# Patient Record
Sex: MALE | Race: WHITE | NOT HISPANIC OR LATINO | Employment: OTHER | URBAN - NONMETROPOLITAN AREA
[De-identification: names, ages, dates, MRNs, and addresses within clinical notes are randomized per-mention and may not be internally consistent; named-entity substitution may affect disease eponyms.]

---

## 2017-11-17 ENCOUNTER — HOSPITAL ENCOUNTER (EMERGENCY)
Facility: HOSPITAL | Age: 77
Discharge: HOME | End: 2017-11-17
Attending: EMERGENCY MEDICINE | Admitting: EMERGENCY MEDICINE

## 2017-11-17 VITALS
DIASTOLIC BLOOD PRESSURE: 70 MMHG | HEIGHT: 71 IN | TEMPERATURE: 98.1 F | RESPIRATION RATE: 16 BRPM | BODY MASS INDEX: 22.4 KG/M2 | OXYGEN SATURATION: 97 % | WEIGHT: 160 LBS | SYSTOLIC BLOOD PRESSURE: 136 MMHG | HEART RATE: 74 BPM

## 2017-11-17 DIAGNOSIS — J06.9 UPPER RESPIRATORY TRACT INFECTION, UNSPECIFIED TYPE: ICD-10-CM

## 2017-11-17 DIAGNOSIS — K40.90 NON-RECURRENT UNILATERAL INGUINAL HERNIA WITHOUT OBSTRUCTION OR GANGRENE: Primary | ICD-10-CM

## 2017-11-17 PROCEDURE — 99284 EMERGENCY DEPT VISIT MOD MDM: CPT

## 2017-11-17 RX ORDER — IBUPROFEN 600 MG/1
600 TABLET ORAL EVERY 6 HOURS PRN
Qty: 30 TABLET | Refills: 0 | Status: SHIPPED | OUTPATIENT
Start: 2017-11-17 | End: 2017-12-06

## 2017-11-17 RX ORDER — HYDROCODONE BITARTRATE AND ACETAMINOPHEN 10; 325 MG/1; MG/1
1 TABLET ORAL ONCE
Status: COMPLETED | OUTPATIENT
Start: 2017-11-17 | End: 2017-11-17

## 2017-11-17 RX ORDER — HYDROCODONE BITARTRATE AND ACETAMINOPHEN 10; 325 MG/1; MG/1
1 TABLET ORAL EVERY 8 HOURS PRN
COMMUNITY

## 2017-11-17 RX ORDER — ASPIRIN 325 MG
325 TABLET ORAL DAILY
COMMUNITY

## 2017-11-17 RX ORDER — ACETAMINOPHEN,DIPHENHYDRAMINE HCL 500; 25 MG/1; MG/1
1 TABLET, FILM COATED ORAL NIGHTLY PRN
COMMUNITY

## 2017-11-17 RX ORDER — HYDROCODONE BITARTRATE AND ACETAMINOPHEN 10; 325 MG/1; MG/1
TABLET ORAL
Status: DISCONTINUED
Start: 2017-11-17 | End: 2017-11-17 | Stop reason: HOSPADM

## 2017-11-17 RX ORDER — CHLORAL HYDRATE 500 MG
2000 CAPSULE ORAL
COMMUNITY

## 2017-11-17 RX ADMIN — HYDROCODONE BITARTRATE AND ACETAMINOPHEN 1 TABLET: 10; 325 TABLET ORAL at 14:34

## 2017-11-17 NOTE — ED NOTES
Patient resting quietly on stretcher with side rails up x2. Son present at bedside. No acute distress noted. No complaints voiced at this time. Hourly rounding performed. Will continue to monitor.      Inez Pickard RN  11/17/17 [id number]

## 2017-11-17 NOTE — ED NOTES
Patient resting quietly on stretcher with side rails up x2. Son present at bedside. Hourly rounding performed. Will continue to monitor.      Inez Pickard RN  11/17/17 [id number]

## 2017-11-17 NOTE — ED NOTES
"Patient presents to Emergency Department with complaints of \"Hernia in my right groin\". Patient reports the hernia happened approximately 2 weeks ago, was not really bothering him until the last couple of days when he started having a cough.  Patient reports the hernia has grown double the size it was and the pain in unbearable \"10\" on scale 1-10 since last night.      Inez Pickard RN  11/17/17 7085    "

## 2017-11-17 NOTE — ED PROVIDER NOTES
Subjective   History of Present Illness  76-year-old white male complains of a hernia.  Patient describes a 2 week history of right groin swelling.  This is mildly tender.  This is worse when he stands up.  She also complains of head congestion, sinus pressure, sore throat for the past few days.  This is better when he takes Claritin-D.  Review of Systems   All other systems reviewed and are negative.      Past Medical History:   Diagnosis Date   • AAA (abdominal aortic aneurysm)    • Arthritis    • Black lung    • CAD (coronary artery disease)    • Diabetes    • Dyslipidemia    • History of CT scan    • HTN (hypertension)    • Hyperlipidemia        No Known Allergies    Past Surgical History:   Procedure Laterality Date   • ABDOMINAL AORTIC ANEURYSM REPAIR WITH ENDOGRAFT  09/23/2011    [place] Excluder   • CARDIAC SURGERY     • CORONARY ARTERY BYPASS GRAFT  2002    x 4        Family History   Problem Relation Age of Onset   • Heart disease Mother    • Heart failure Father        Social History     Social History   • Marital status:      Spouse name: N/A   • Number of children: N/A   • Years of education: N/A     Occupational History   • RETIRED      [employer]      Social History Main Topics   • Smoking status: Current Every Day Smoker     Packs/day: 1.00     Years: 60.00     Types: Cigarettes   • Smokeless tobacco: Never Used   • Alcohol use No   • Drug use: No   • Sexual activity: Defer     Other Topics Concern   • None     Social History Narrative   • None           Objective   Physical Exam   Constitutional: He is oriented to person, place, and time. He appears well-developed and well-nourished.   HENT:   Head: Normocephalic and atraumatic.   Cardiovascular: Normal rate, regular rhythm and normal heart sounds.  Exam reveals no gallop and no friction rub.    No murmur heard.  Pulmonary/Chest: Effort normal and breath sounds normal. No respiratory distress. He has no wheezes. He has no rales.   Abdominal:  Soft. Bowel sounds are normal. He exhibits no distension. There is no tenderness. A hernia is present. Hernia confirmed positive in the right inguinal area (She states centimeter right inguinal hernia easily reducible with no overlying erythema, no tenderness.) and confirmed positive in the left inguinal area (All iinguinal hernia.).   Genitourinary: Testes normal and penis normal.   Musculoskeletal: Normal range of motion. He exhibits no edema.   Neurological: He is alert and oriented to person, place, and time.   Skin: Skin is warm and dry.   Psychiatric: He has a normal mood and affect.   Nursing note and vitals reviewed.      Procedures         ED Course  ED Course                  MDM  Number of Diagnoses or Management Options  Non-recurrent unilateral inguinal hernia without obstruction or gangrene:   Upper respiratory tract infection, unspecified type:   Risk of Complications, Morbidity, and/or Mortality  Presenting problems: moderate  Diagnostic procedures: moderate  Management options: moderate        Final diagnoses:   Non-recurrent unilateral inguinal hernia without obstruction or gangrene   Upper respiratory tract infection, unspecified type            Bony Mota MD  11/17/17 [id number]

## 2017-11-21 ENCOUNTER — OFFICE VISIT (OUTPATIENT)
Dept: SURGERY | Facility: CLINIC | Age: 77
End: 2017-11-21

## 2017-11-21 VITALS
BODY MASS INDEX: 22.4 KG/M2 | SYSTOLIC BLOOD PRESSURE: 120 MMHG | HEART RATE: 71 BPM | WEIGHT: 160 LBS | DIASTOLIC BLOOD PRESSURE: 80 MMHG | HEIGHT: 71 IN

## 2017-11-21 DIAGNOSIS — I71.40 ABDOMINAL AORTIC ANEURYSM (AAA) WITHOUT RUPTURE (HCC): Primary | ICD-10-CM

## 2017-11-21 DIAGNOSIS — K40.90 NON-RECURRENT UNILATERAL INGUINAL HERNIA WITHOUT OBSTRUCTION OR GANGRENE: ICD-10-CM

## 2017-11-21 DIAGNOSIS — K42.9 UMBILICAL HERNIA WITHOUT OBSTRUCTION AND WITHOUT GANGRENE: ICD-10-CM

## 2017-11-21 PROCEDURE — 99205 OFFICE O/P NEW HI 60 MIN: CPT | Performed by: SURGERY

## 2017-11-21 NOTE — PROGRESS NOTES
Subjective   Everardo Alex is a 76 y.o. male is being seen for consultation today at the request of Pebbles Villalpando MD    HPI Comments: 76-year-old male with reducible right inguinal hernia that he noted 2 weeks ago while lifting.  No pain reported when at rest in the hernia does reduce.  He also has an incarcerated umbilical hernia containing omentum that is tender to palpation.  The patient smokes 1-2 packs per day and states he will not stop smoking.  He understands the risks of recurrence of hernia repair and I counseled him extensively over smoking cessation which he refuses.  The patient has a history of AAA repair as well as open heart bypass ×2.  He is on daily aspirin but no Plavix or other anticoagulation reported.      Past Medical History:   Diagnosis Date   • AAA (abdominal aortic aneurysm)    • Arthritis    • Black lung    • CAD (coronary artery disease)    • Diabetes    • Dyslipidemia    • History of CT scan    • HTN (hypertension)    • Hyperlipidemia        Family History   Problem Relation Age of Onset   • Heart disease Mother    • Heart failure Father        Social History     Social History   • Marital status:      Spouse name: N/A   • Number of children: N/A   • Years of education: N/A     Occupational History   • RETIRED      "[employer]      Social History Main Topics   • Smoking status: Current Every Day Smoker     Packs/day: 1.00     Years: 60.00     Types: Cigarettes   • Smokeless tobacco: Never Used   • Alcohol use No   • Drug use: No   • Sexual activity: Defer     Other Topics Concern   • Not on file     Social History Narrative       Past Surgical History:   Procedure Laterality Date   • ABDOMINAL AORTIC ANEURYSM REPAIR WITH ENDOGRAFT  09/23/2011    [place] Excluder   • CARDIAC SURGERY     • CORONARY ARTERY BYPASS GRAFT  2002    x 4        The following portions of the patient's history were reviewed and updated as appropriate: allergies, current medications, past family history,  "past medical history, past social history, past surgical history and problem list.    Review of Systems   Constitutional: Negative for activity change, appetite change, chills and fever.   HENT: Negative for sore throat and trouble swallowing.    Eyes: Negative for visual disturbance.   Respiratory: Negative for cough and shortness of breath.    Cardiovascular: Negative for chest pain and palpitations.   Gastrointestinal: Positive for abdominal pain. Negative for abdominal distention, blood in stool, constipation, diarrhea, nausea and vomiting.   Endocrine: Negative for cold intolerance and heat intolerance.   Genitourinary: Negative for dysuria.   Musculoskeletal: Negative for joint swelling.   Skin: Negative for color change, rash and wound.   Allergic/Immunologic: Negative for immunocompromised state.   Neurological: Negative for dizziness, seizures, weakness and headaches.   Hematological: Negative for adenopathy. Does not bruise/bleed easily.   Psychiatric/Behavioral: Negative for agitation and confusion.         /80  Pulse 71  Ht 71\" (180.3 cm)  Wt 160 lb (72.6 kg)  BMI 22.32 kg/m2  Objective   Physical Exam   Constitutional: He is oriented to person, place, and time. He appears well-developed.   HENT:   Head: Normocephalic and atraumatic.   Mouth/Throat: Mucous membranes are normal.   Eyes: Conjunctivae are normal. Pupils are equal, round, and reactive to light.   Neck: Neck supple. No JVD present. No tracheal deviation present. No thyromegaly present.   Cardiovascular: Normal rate and regular rhythm.  Exam reveals no gallop and no friction rub.    No murmur heard.  Pulmonary/Chest: Effort normal. He has wheezes.   Abdominal: Soft. He exhibits no distension. There is no splenomegaly or hepatomegaly. There is no tenderness. A hernia (mbilical hernia with incarcerated tender omental) is present. Hernia confirmed positive in the right inguinal area (reducible).   Musculoskeletal: Normal range of " motion. He exhibits no deformity.   Neurological: He is alert and oriented to person, place, and time.   Skin: Skin is warm and dry.   Psychiatric: He has a normal mood and affect.         Everardo was seen today for right inguinal hernia.    Diagnoses and all orders for this visit:    Abdominal aortic aneurysm (AAA) without rupture  -     Case Request; Standing  -     Type and screen; Future  -     ceFAZolin (ANCEF) IVPB (duplex) 2 g; Infuse 2,000 mg into a venous catheter 1 (One) Time.  -     Case Request    Non-recurrent unilateral inguinal hernia without obstruction or gangrene    Umbilical hernia without obstruction and without gangrene    Other orders  -     Follow anesthesia standing orders.  -     Provide instructions to patient on NPO status  -     Clorhexidine skin prep  -     Follow Anesthesia Guidelines / Standing Orders; Standing  -     Verify NPO Status; Standing  -     Obtain informed consent; Standing  -     Instructions on coughing, deep breathing, and incentive spirometry.; Standing        Assessment     76-year-old male with peripheral vascular disease and history of endovascular AAA repair.  He has an incarcerated umbilical hernia containing omentum that is painful.  He has a reducible right inguinal hernia that causes discomfort when lifting.  He refuses to stop smoking and understands risks and benefits of surgery including is high risk of recurrence is a smoker.  Despite these risks he was willing to proceed with open umbilical and open right inguinal hernia repair with mesh.

## 2017-12-06 ENCOUNTER — APPOINTMENT (OUTPATIENT)
Dept: PREADMISSION TESTING | Facility: HOSPITAL | Age: 77
End: 2017-12-06

## 2017-12-06 LAB
ANION GAP SERPL CALCULATED.3IONS-SCNC: 7.3 MMOL/L (ref 3.6–11.2)
BUN BLD-MCNC: 19 MG/DL (ref 7–21)
BUN/CREAT SERPL: 19.4 (ref 7–25)
CALCIUM SPEC-SCNC: 10.5 MG/DL (ref 7.7–10)
CHLORIDE SERPL-SCNC: 103 MMOL/L (ref 99–112)
CO2 SERPL-SCNC: 27.7 MMOL/L (ref 24.3–31.9)
CREAT BLD-MCNC: 0.98 MG/DL (ref 0.43–1.29)
DEPRECATED RDW RBC AUTO: 42.6 FL (ref 37–54)
ERYTHROCYTE [DISTWIDTH] IN BLOOD BY AUTOMATED COUNT: 13.3 % (ref 11.5–14.5)
GFR SERPL CREATININE-BSD FRML MDRD: 74 ML/MIN/1.73
GLUCOSE BLD-MCNC: 130 MG/DL (ref 70–110)
HCT VFR BLD AUTO: 47.5 % (ref 42–52)
HGB BLD-MCNC: 15.9 G/DL (ref 14–18)
MCH RBC QN AUTO: 29.4 PG (ref 27–33)
MCHC RBC AUTO-ENTMCNC: 33.5 G/DL (ref 33–37)
MCV RBC AUTO: 88 FL (ref 80–94)
OSMOLALITY SERPL CALC.SUM OF ELEC: 279.7 MOSM/KG (ref 273–305)
PLATELET # BLD AUTO: 286 10*3/MM3 (ref 130–400)
PMV BLD AUTO: 10 FL (ref 6–10)
POTASSIUM BLD-SCNC: 4.4 MMOL/L (ref 3.5–5.3)
RBC # BLD AUTO: 5.4 10*6/MM3 (ref 4.7–6.1)
SODIUM BLD-SCNC: 138 MMOL/L (ref 135–153)
WBC NRBC COR # BLD: 7.99 10*3/MM3 (ref 4.5–12.5)

## 2017-12-06 PROCEDURE — 93005 ELECTROCARDIOGRAM TRACING: CPT

## 2017-12-06 PROCEDURE — 93010 ELECTROCARDIOGRAM REPORT: CPT | Performed by: INTERNAL MEDICINE

## 2017-12-06 PROCEDURE — 36415 COLL VENOUS BLD VENIPUNCTURE: CPT

## 2017-12-06 PROCEDURE — 85027 COMPLETE CBC AUTOMATED: CPT | Performed by: SURGERY

## 2017-12-06 PROCEDURE — 80048 BASIC METABOLIC PNL TOTAL CA: CPT | Performed by: SURGERY

## 2017-12-06 NOTE — DISCHARGE INSTRUCTIONS
TAKE the following medications the morning of surgery:  All heart or blood pressure medications    Please discontinue all blood thinners and anticoagulants (except aspirin) prior to surgery as per your surgeon and cardiologist instructions.  Aspirin may be continued up to the day prior to surgery.    HOLD all diabetic medications the morning of surgery as order by physician.    Please follow instructions on use of prep cloths provided by nurse. Return instruction sheet with stickers attached to pre-op nurse on day of surgery.    Arrival time for surgery on 12/8/2017 is 0730 am.    General Instructions:  • Do NOT eat or drink after midnight 12/7/2017 which includes water, mints, or gum.  • You may brush your teeth. Dental appliances that are removable must be taken out day of surgery.  • Do NOT smoke, chew tobacco, or drink alcohol within 24 hours prior to surgery.  • Bring medications in original bottles, any inhalers and if applicable your C-PAP/BI-PAP machine  • Bring any papers given to you in the doctor’s office  • Wear clean, comfortable clothes and socks  • Do NOT wear contact lenses or make-up or dark nail polish.  Bring a case for your glasses if applicable.  • Bring crutches or walker if applicable  • Leave all other valuables and jewelry at home  • If you were given a blood bank armband, continue to wear it until discharged.    Preventing a Surgical Site Infection:  • Shower on the morning of surgery using a fresh bar of anti-bacterial soap (such as Dial) and clean washcloth.  Dry with a clean towel and dress in clean clothing.  • For 2 to 3 days before surgery, avoid shaving with a razor near where you will have surgery because the razor can irritate skin and make it easier to develop an infection.  Ask your surgeon if you will be receiving antibiotics prior to surgery.  • Make sure you, your family, and all healthcare providers clean their hands with soap and water or an alcohol-based hand   before caring for you or your wound.  • If at all possible, quit smoking as many days before surgery as you can.    Day of Surgery:  Upon arrival, a pre-op nurse and anesthesiologist will review your health history, obtain vital signs, and answer questions you may have.  The only belongings needed at this time will be your home medications and if applicable you C-PAP/BI-PAP machine.  If you are staying overnight, your family can leave the rest of your belongings in the car and bring them to your room later.  A pre-op nurse will start an IV and you may receive medication in preparation for surgery.  Due to patient privacy and limited space, only one member of your family will be able to accompany you in the pre-op area.  While you are in surgery your family should notify the waiting room  if they leave the waiting room area and provide a contact number.  Please be aware that surgery does come with discomfort.  We want to make every effort to control your discomfort so please discuss any uncontrolled symptoms with your nurse.  Your doctor will most likely have prescribed pain medications.  If you are going home after surgery you will receive individualized written care instructions before being discharged.  A responsible adult must drive you to and from the hospital on the day of surgery and stay with you for 24 hours.  If you are staying overnight following surgery, you will be transported to your hospital room following the recovery period.

## 2017-12-08 ENCOUNTER — ANESTHESIA EVENT (OUTPATIENT)
Dept: PERIOP | Facility: HOSPITAL | Age: 77
End: 2017-12-08

## 2017-12-08 ENCOUNTER — ANESTHESIA (OUTPATIENT)
Dept: PERIOP | Facility: HOSPITAL | Age: 77
End: 2017-12-08

## 2017-12-08 ENCOUNTER — HOSPITAL ENCOUNTER (OUTPATIENT)
Facility: HOSPITAL | Age: 77
Setting detail: HOSPITAL OUTPATIENT SURGERY
Discharge: HOME | End: 2017-12-08
Attending: SURGERY | Admitting: SURGERY

## 2017-12-08 VITALS
WEIGHT: 160 LBS | RESPIRATION RATE: 16 BRPM | TEMPERATURE: 98.6 F | HEART RATE: 78 BPM | DIASTOLIC BLOOD PRESSURE: 78 MMHG | SYSTOLIC BLOOD PRESSURE: 128 MMHG | OXYGEN SATURATION: 100 % | HEIGHT: 71 IN | BODY MASS INDEX: 22.4 KG/M2

## 2017-12-08 DIAGNOSIS — I71.40 ABDOMINAL AORTIC ANEURYSM (AAA) WITHOUT RUPTURE (HCC): ICD-10-CM

## 2017-12-08 LAB
GLUCOSE BLDC GLUCOMTR-MCNC: 107 MG/DL (ref 70–130)
GLUCOSE BLDC GLUCOMTR-MCNC: 96 MG/DL (ref 70–130)

## 2017-12-08 PROCEDURE — C1781 MESH (IMPLANTABLE): HCPCS | Performed by: SURGERY

## 2017-12-08 PROCEDURE — 25010000002 FENTANYL CITRATE (PF) 100 MCG/2ML SOLUTION: Performed by: NURSE ANESTHETIST, CERTIFIED REGISTERED

## 2017-12-08 PROCEDURE — 49587 PR REPAIR UMBILICAL HERN,5+Y/O,STRANG: CPT | Performed by: SURGERY

## 2017-12-08 PROCEDURE — 25010000002 ONDANSETRON PER 1 MG: Performed by: NURSE ANESTHETIST, CERTIFIED REGISTERED

## 2017-12-08 PROCEDURE — 25010000002 PROPOFOL 10 MG/ML EMULSION: Performed by: NURSE ANESTHETIST, CERTIFIED REGISTERED

## 2017-12-08 PROCEDURE — 94799 UNLISTED PULMONARY SVC/PX: CPT

## 2017-12-08 PROCEDURE — 25010000003 CEFAZOLIN PER 500 MG: Performed by: SURGERY

## 2017-12-08 PROCEDURE — 82962 GLUCOSE BLOOD TEST: CPT

## 2017-12-08 PROCEDURE — 49505 PRP I/HERN INIT REDUC >5 YR: CPT | Performed by: SURGERY

## 2017-12-08 DEVICE — BARD MESH PERFIX PLUG, LARGE
Type: IMPLANTABLE DEVICE | Status: FUNCTIONAL
Brand: BARD MESH PERFIX PLUG

## 2017-12-08 RX ORDER — SODIUM CHLORIDE 0.9 % (FLUSH) 0.9 %
1-10 SYRINGE (ML) INJECTION AS NEEDED
Status: DISCONTINUED | OUTPATIENT
Start: 2017-12-08 | End: 2017-12-08 | Stop reason: HOSPADM

## 2017-12-08 RX ORDER — LIDOCAINE HYDROCHLORIDE 20 MG/ML
INJECTION, SOLUTION INFILTRATION; PERINEURAL AS NEEDED
Status: DISCONTINUED | OUTPATIENT
Start: 2017-12-08 | End: 2017-12-08 | Stop reason: SURG

## 2017-12-08 RX ORDER — IPRATROPIUM BROMIDE AND ALBUTEROL SULFATE 2.5; .5 MG/3ML; MG/3ML
3 SOLUTION RESPIRATORY (INHALATION) ONCE AS NEEDED
Status: DISCONTINUED | OUTPATIENT
Start: 2017-12-08 | End: 2017-12-08 | Stop reason: HOSPADM

## 2017-12-08 RX ORDER — ONDANSETRON 2 MG/ML
INJECTION INTRAMUSCULAR; INTRAVENOUS AS NEEDED
Status: DISCONTINUED | OUTPATIENT
Start: 2017-12-08 | End: 2017-12-08 | Stop reason: SURG

## 2017-12-08 RX ORDER — PROPOFOL 10 MG/ML
VIAL (ML) INTRAVENOUS AS NEEDED
Status: DISCONTINUED | OUTPATIENT
Start: 2017-12-08 | End: 2017-12-08 | Stop reason: SURG

## 2017-12-08 RX ORDER — ONDANSETRON 2 MG/ML
4 INJECTION INTRAMUSCULAR; INTRAVENOUS ONCE AS NEEDED
Status: DISCONTINUED | OUTPATIENT
Start: 2017-12-08 | End: 2017-12-08 | Stop reason: HOSPADM

## 2017-12-08 RX ORDER — HYDROCODONE BITARTRATE AND ACETAMINOPHEN 5; 325 MG/1; MG/1
1-2 TABLET ORAL EVERY 4 HOURS PRN
Qty: 20 TABLET | Refills: 0 | Status: SHIPPED | OUTPATIENT
Start: 2017-12-08

## 2017-12-08 RX ORDER — SODIUM CHLORIDE, SODIUM LACTATE, POTASSIUM CHLORIDE, CALCIUM CHLORIDE 600; 310; 30; 20 MG/100ML; MG/100ML; MG/100ML; MG/100ML
125 INJECTION, SOLUTION INTRAVENOUS CONTINUOUS
Status: DISCONTINUED | OUTPATIENT
Start: 2017-12-08 | End: 2017-12-08 | Stop reason: HOSPADM

## 2017-12-08 RX ORDER — BUPIVACAINE HYDROCHLORIDE AND EPINEPHRINE 5; 5 MG/ML; UG/ML
INJECTION, SOLUTION EPIDURAL; INTRACAUDAL; PERINEURAL AS NEEDED
Status: DISCONTINUED | OUTPATIENT
Start: 2017-12-08 | End: 2017-12-08 | Stop reason: HOSPADM

## 2017-12-08 RX ORDER — OXYCODONE HYDROCHLORIDE AND ACETAMINOPHEN 5; 325 MG/1; MG/1
1 TABLET ORAL ONCE AS NEEDED
Status: DISCONTINUED | OUTPATIENT
Start: 2017-12-08 | End: 2017-12-08 | Stop reason: HOSPADM

## 2017-12-08 RX ORDER — FAMOTIDINE 10 MG/ML
INJECTION, SOLUTION INTRAVENOUS AS NEEDED
Status: DISCONTINUED | OUTPATIENT
Start: 2017-12-08 | End: 2017-12-08 | Stop reason: SURG

## 2017-12-08 RX ORDER — MAGNESIUM HYDROXIDE 1200 MG/15ML
LIQUID ORAL AS NEEDED
Status: DISCONTINUED | OUTPATIENT
Start: 2017-12-08 | End: 2017-12-08 | Stop reason: HOSPADM

## 2017-12-08 RX ORDER — FENTANYL CITRATE 50 UG/ML
50 INJECTION, SOLUTION INTRAMUSCULAR; INTRAVENOUS
Status: DISCONTINUED | OUTPATIENT
Start: 2017-12-08 | End: 2017-12-08 | Stop reason: HOSPADM

## 2017-12-08 RX ORDER — FENTANYL CITRATE 50 UG/ML
INJECTION, SOLUTION INTRAMUSCULAR; INTRAVENOUS AS NEEDED
Status: DISCONTINUED | OUTPATIENT
Start: 2017-12-08 | End: 2017-12-08 | Stop reason: SURG

## 2017-12-08 RX ORDER — MEPERIDINE HYDROCHLORIDE 25 MG/ML
12.5 INJECTION INTRAMUSCULAR; INTRAVENOUS; SUBCUTANEOUS
Status: DISCONTINUED | OUTPATIENT
Start: 2017-12-08 | End: 2017-12-08 | Stop reason: HOSPADM

## 2017-12-08 RX ADMIN — EPHEDRINE SULFATE 10 MG: 50 INJECTION INTRAMUSCULAR; INTRAVENOUS; SUBCUTANEOUS at 11:10

## 2017-12-08 RX ADMIN — CEFAZOLIN SODIUM 2 G: 2 SOLUTION INTRAVENOUS at 10:39

## 2017-12-08 RX ADMIN — EPHEDRINE SULFATE 10 MG: 50 INJECTION INTRAMUSCULAR; INTRAVENOUS; SUBCUTANEOUS at 11:00

## 2017-12-08 RX ADMIN — EPHEDRINE SULFATE 10 MG: 50 INJECTION INTRAMUSCULAR; INTRAVENOUS; SUBCUTANEOUS at 11:27

## 2017-12-08 RX ADMIN — LIDOCAINE HYDROCHLORIDE 60 MG: 20 INJECTION, SOLUTION INFILTRATION; PERINEURAL at 10:44

## 2017-12-08 RX ADMIN — FENTANYL CITRATE 50 MCG: 50 INJECTION INTRAMUSCULAR; INTRAVENOUS at 11:13

## 2017-12-08 RX ADMIN — SODIUM CHLORIDE, POTASSIUM CHLORIDE, SODIUM LACTATE AND CALCIUM CHLORIDE 125 ML/HR: 600; 310; 30; 20 INJECTION, SOLUTION INTRAVENOUS at 09:06

## 2017-12-08 RX ADMIN — FENTANYL CITRATE 50 MCG: 50 INJECTION INTRAMUSCULAR; INTRAVENOUS at 11:26

## 2017-12-08 RX ADMIN — FAMOTIDINE 20 MG: 10 INJECTION, SOLUTION INTRAVENOUS at 10:39

## 2017-12-08 RX ADMIN — ONDANSETRON 4 MG: 2 INJECTION, SOLUTION INTRAMUSCULAR; INTRAVENOUS at 10:39

## 2017-12-08 RX ADMIN — PROPOFOL 150 MG: 10 INJECTION, EMULSION INTRAVENOUS at 10:44

## 2017-12-08 RX ADMIN — EPHEDRINE SULFATE 10 MG: 50 INJECTION INTRAMUSCULAR; INTRAVENOUS; SUBCUTANEOUS at 11:02

## 2017-12-08 RX ADMIN — SODIUM CHLORIDE, POTASSIUM CHLORIDE, SODIUM LACTATE AND CALCIUM CHLORIDE: 600; 310; 30; 20 INJECTION, SOLUTION INTRAVENOUS at 11:17

## 2017-12-08 RX ADMIN — FENTANYL CITRATE 100 MCG: 50 INJECTION INTRAMUSCULAR; INTRAVENOUS at 10:44

## 2017-12-08 RX ADMIN — EPHEDRINE SULFATE 10 MG: 50 INJECTION INTRAMUSCULAR; INTRAVENOUS; SUBCUTANEOUS at 10:58

## 2017-12-08 NOTE — OP NOTE
INGUINAL HERNIA REPAIR, UMBILICAL HERNIA REPAIR LAPAROSCOPIC  Procedure Note    Everardo Alex  12/8/2017    Pre-op Diagnosis:   Direct right inguinal hernia (reducible), incarcerated umbilical hernia    Post-op Diagnosis:   Same    Indications: See above    Procedure(s):  INGUINAL HERNIA REPAIR  UMBILICAL HERNIA REPAIR    Surgeon(s):  Elio Davis MD    Anesthesia: Choice    Staff:   Circulator: Shu Douglas RN  Scrub Person: Jaskaran Moreno  Assistant: Alex Bush CSA  Other: Anatoly Cespedes    Findings: right inguinal hernia through direct defect.  Mesh plug patch repair performed.  Incarcerated umbilical hernia with a subcentimeter defect closed primarily.    Operative Procedure: The patient was taken operating suite and placed supine on operating table.  Bilateral sequential compression devices were applied and general endotracheal anesthesia administered.  The abdomen was prepped and draped in usual sterile fashion.  Preoperative antibiotics were confirmed.  Timeout procedure was performed.  An oblique incision from the pubic tubercle to the anterior superior iliac spine was made and dissection was carried to the subcutaneous tissues and Luis E's fascia to the external oblique aponeurosis.  The aponeurosis was opened sharply through the external ring.  Blunt dissection exposed the shelving edge of the inguinal ligament laterally and the conjoined tendon medially.  With blunt dissection the hernia sac and cord structures were then controlled circumferentially with a Penrose drain over the pubic tubercle.  The cremaster muscles were divided longitudinally exposing the hernia sac which was dissected free from the cord structures.  The hernia sac was opened and the contents reduced into the peritoneal space.  The hernia sac was then ligated at its base at the internal ring and excised.  The cord structures and vas deferens were preserved intact throughout the operation.  The ilioinguinal  nerve was also identified and dissected free to allow mobility to avoid mesh entrapment.  With the hernia reduced the Bard soft mesh was cut to appropriate size and configuration.  The mesh was then anchored with interrupted Nurolon sutures to the pubic tubercle medially and inferiorly.  Laterally the mesh was anchored to the shelving edge of the inguinal ligament and medially to the conjoined tendon.  The tails of the mesh were then placed around the cord structures to re-create the internal ring.  The tails of the mesh were reapproximated one another with figure-of-eight Nurolon suture.  The tails of the mesh were tucked under the external oblique aponeurosis and the wound was irrigated to clear and all irrigant was suctioned free.  The external oblique aponeurosis reapproximated with running Vicryl suture to re-create the external ring.  The Luis E's fascia was then reapproximated with interrupted Vicryl suture.  The skin was closed with Monocryl subcuticular suture and a dressing was placed.  Attention was turned to the umbilicus.  A curvilinear infraumbilical incision was made.  Circumferential dissection around the umbilical stalk was made.  The hernia sac was then dissected free from the umbilical dermis and the base of the umbilicus was divided with cautery.  The hernia contained incarcerated omentum that was herniated through a subcentimeter hernia defect.  At this time the omentum was divided with cautery and removed.  The remainder the omentum reduced easily into the abdomen.  The hernia defect was closed with a series of interrupted Nurolon sutures and the umbilical dermis was tacked down to the repair with a Vicryl suture.  The incision was closed with subcutaneous Vicryl and Monocryl skin reapproximation.  The right testicle was reduced into the scrotum and the patient was awakened from anesthesia and taken recovery once all counts were correct.    Estimated Blood Loss: 5 mL    Specimens:   None                  Drains: None    Grafts/Implants: Mesh plug patch right inguinal canal    Complications: None      Elio Davis MD     Date: 12/8/2017  Time: 1:33 PM

## 2017-12-08 NOTE — ANESTHESIA PREPROCEDURE EVALUATION
Anesthesia Evaluation     Patient summary reviewed and Nursing notes reviewed   no history of anesthetic complications:  NPO Solid Status: > 8 hours  NPO Liquid Status: > 8 hours     Airway   Mallampati: II  TM distance: >3 FB  Neck ROM: full  no difficulty expected  Dental - normal exam   (+) lower dentures and upper dentures    Pulmonary - normal exam    breath sounds clear to auscultation  (+) a smoker Current Abstained day of surgery,   Cardiovascular - normal exam    ECG reviewed  Rhythm: regular  Rate: normal    (+) hypertension well controlled, CAD, CABG, MCMILLAN, PVD, hyperlipidemia      Neuro/Psych- negative ROS  GI/Hepatic/Renal/Endo    (+)  diabetes mellitus type 2 poorly controlled,     Musculoskeletal     Abdominal  - normal exam    Bowel sounds: normal.   Substance History - negative use     OB/GYN negative ob/gyn ROS         Other   (+) arthritis                                     Anesthesia Plan    ASA 3     general     intravenous induction   Anesthetic plan and risks discussed with patient.    Plan discussed with CRNA.

## 2017-12-08 NOTE — ANESTHESIA PROCEDURE NOTES
Airway  Urgency: elective    Date/Time: 12/8/2017 10:44 AM  Airway not difficult    General Information and Staff    Patient location during procedure: OR  Anesthesiologist: AINSLEY BOSE  CRNA: STEVE LÓPEZ    Indications and Patient Condition  Indications for airway management: airway protection    Preoxygenated: yes  Mask difficulty assessment: 0 - not attempted    Final Airway Details  Final airway type: supraglottic airway      Successful airway: classic  Size 4    Number of attempts at approach: 1    Additional Comments  LMA placed with no trauma noted. Patient tolerated well. Good seal. Secured.

## 2017-12-08 NOTE — H&P (VIEW-ONLY)
Subjective   Everardo Alex is a 76 y.o. male is being seen for consultation today at the request of Pebbles Villalpando MD    HPI Comments: 76-year-old male with reducible right inguinal hernia that he noted 2 weeks ago while lifting.  No pain reported when at rest in the hernia does reduce.  He also has an incarcerated umbilical hernia containing omentum that is tender to palpation.  The patient smokes 1-2 packs per day and states he will not stop smoking.  He understands the risks of recurrence of hernia repair and I counseled him extensively over smoking cessation which he refuses.  The patient has a history of AAA repair as well as open heart bypass ×2.  He is on daily aspirin but no Plavix or other anticoagulation reported.      Past Medical History:   Diagnosis Date   • AAA (abdominal aortic aneurysm)    • Arthritis    • Black lung    • CAD (coronary artery disease)    • Diabetes    • Dyslipidemia    • History of CT scan    • HTN (hypertension)    • Hyperlipidemia        Family History   Problem Relation Age of Onset   • Heart disease Mother    • Heart failure Father        Social History     Social History   • Marital status:      Spouse name: N/A   • Number of children: N/A   • Years of education: N/A     Occupational History   • RETIRED      State      Social History Main Topics   • Smoking status: Current Every Day Smoker     Packs/day: 1.00     Years: 60.00     Types: Cigarettes   • Smokeless tobacco: Never Used   • Alcohol use No   • Drug use: No   • Sexual activity: Defer     Other Topics Concern   • Not on file     Social History Narrative       Past Surgical History:   Procedure Laterality Date   • ABDOMINAL AORTIC ANEURYSM REPAIR WITH ENDOGRAFT  09/23/2011    [place] Excluder   • CARDIAC SURGERY     • CORONARY ARTERY BYPASS GRAFT  2002    x 4        The following portions of the patient's history were reviewed and updated as appropriate: allergies, current medications, past family history,  "past medical history, past social history, past surgical history and problem list.    Review of Systems   Constitutional: Negative for activity change, appetite change, chills and fever.   HENT: Negative for sore throat and trouble swallowing.    Eyes: Negative for visual disturbance.   Respiratory: Negative for cough and shortness of breath.    Cardiovascular: Negative for chest pain and palpitations.   Gastrointestinal: Positive for abdominal pain. Negative for abdominal distention, blood in stool, constipation, diarrhea, nausea and vomiting.   Endocrine: Negative for cold intolerance and heat intolerance.   Genitourinary: Negative for dysuria.   Musculoskeletal: Negative for joint swelling.   Skin: Negative for color change, rash and wound.   Allergic/Immunologic: Negative for immunocompromised state.   Neurological: Negative for dizziness, seizures, weakness and headaches.   Hematological: Negative for adenopathy. Does not bruise/bleed easily.   Psychiatric/Behavioral: Negative for agitation and confusion.         /80  Pulse 71  Ht 71\" (180.3 cm)  Wt 160 lb (72.6 kg)  BMI 22.32 kg/m2  Objective   Physical Exam   Constitutional: He is oriented to person, place, and time. He appears well-developed.   HENT:   Head: Normocephalic and atraumatic.   Mouth/Throat: Mucous membranes are normal.   Eyes: Conjunctivae are normal. Pupils are equal, round, and reactive to light.   Neck: Neck supple. No JVD present. No tracheal deviation present. No thyromegaly present.   Cardiovascular: Normal rate and regular rhythm.  Exam reveals no gallop and no friction rub.    No murmur heard.  Pulmonary/Chest: Effort normal. He has wheezes.   Abdominal: Soft. He exhibits no distension. There is no splenomegaly or hepatomegaly. There is no tenderness. A hernia (mbilical hernia with incarcerated tender omental) is present. Hernia confirmed positive in the right inguinal area (reducible).   Musculoskeletal: Normal range of " motion. He exhibits no deformity.   Neurological: He is alert and oriented to person, place, and time.   Skin: Skin is warm and dry.   Psychiatric: He has a normal mood and affect.         Everardo was seen today for right inguinal hernia.    Diagnoses and all orders for this visit:    Abdominal aortic aneurysm (AAA) without rupture  -     Case Request; Standing  -     Type and screen; Future  -     ceFAZolin (ANCEF) IVPB (duplex) 2 g; Infuse 2,000 mg into a venous catheter 1 (One) Time.  -     Case Request    Non-recurrent unilateral inguinal hernia without obstruction or gangrene    Umbilical hernia without obstruction and without gangrene    Other orders  -     Follow anesthesia standing orders.  -     Provide instructions to patient on NPO status  -     Clorhexidine skin prep  -     Follow Anesthesia Guidelines / Standing Orders; Standing  -     Verify NPO Status; Standing  -     Obtain informed consent; Standing  -     Instructions on coughing, deep breathing, and incentive spirometry.; Standing        Assessment     76-year-old male with peripheral vascular disease and history of endovascular AAA repair.  He has an incarcerated umbilical hernia containing omentum that is painful.  He has a reducible right inguinal hernia that causes discomfort when lifting.  He refuses to stop smoking and understands risks and benefits of surgery including is high risk of recurrence is a smoker.  Despite these risks he was willing to proceed with open umbilical and open right inguinal hernia repair with mesh.

## 2017-12-08 NOTE — ANESTHESIA POSTPROCEDURE EVALUATION
Patient: Everardo Alex    Procedure Summary     Date Anesthesia Start Anesthesia Stop Room / Location    12/08/17 1039 1144  [facility] / [facility]       Procedure Diagnosis Surgeon Provider    INGUINAL HERNIA REPAIR (Right Abdomen); UMBILICAL HERNIA REPAIR LAPAROSCOPIC (N/A Abdomen) Abdominal aortic aneurysm (AAA) without rupture  (Abdominal aortic aneurysm (AAA) without rupture [I71.4]) MD Carlin Alva, DO          Anesthesia Type: general  Last vitals  BP   125/77 (12/08/17 1215)   Temp   98.9 °F (37.2 °C) (12/08/17 1145)   Pulse   76 (12/08/17 1215)   Resp   11 (12/08/17 1215)     SpO2   100 % (12/08/17 1215)     Post Anesthesia Care and Evaluation    Patient location during evaluation: PHASE II  Patient participation: complete - patient participated  Level of consciousness: awake and alert  Pain score: 1  Pain management: adequate  Airway patency: patent  Anesthetic complications: No anesthetic complications  PONV Status: controlled  Cardiovascular status: acceptable  Respiratory status: acceptable and room air  Hydration status: acceptable  No anesthesia care post op

## 2018-02-20 ENCOUNTER — TELEPHONE (OUTPATIENT)
Dept: CARDIAC SURGERY | Facility: CLINIC | Age: 78
End: 2018-02-20

## 2018-02-20 NOTE — TELEPHONE ENCOUNTER
CALLED PT REGARDING RECALL PER [facility] IN [place], CLAIMS PT DOES NOT WANT TO SEE HIM IN OFFICE AT ALL AND WILL NOT BE COMING BACK 2/20/2018

## 2021-01-25 ENCOUNTER — HOSPITAL ENCOUNTER (EMERGENCY)
Facility: HOSPITAL | Age: 81
Discharge: LEFT BEFORE DISCHARGE | End: 2021-01-25

## 2021-01-25 VITALS
TEMPERATURE: 97.8 F | DIASTOLIC BLOOD PRESSURE: 79 MMHG | HEIGHT: 72 IN | SYSTOLIC BLOOD PRESSURE: 128 MMHG | OXYGEN SATURATION: 95 % | WEIGHT: 160 LBS | HEART RATE: 63 BPM | RESPIRATION RATE: 18 BRPM | BODY MASS INDEX: 21.67 KG/M2

## 2021-01-25 PROCEDURE — 99211 OFF/OP EST MAY X REQ PHY/QHP: CPT

## 2021-01-25 PROCEDURE — 93010 ELECTROCARDIOGRAM REPORT: CPT | Performed by: INTERNAL MEDICINE

## 2021-01-25 PROCEDURE — 93005 ELECTROCARDIOGRAM TRACING: CPT | Performed by: EMERGENCY MEDICINE

## 2021-02-18 ENCOUNTER — TRANSCRIBE ORDERS (OUTPATIENT)
Dept: ADMINISTRATIVE | Facility: HOSPITAL | Age: 81
End: 2021-02-18

## 2021-02-18 DIAGNOSIS — H90.3 SENSORY HEARING LOSS, BILATERAL: ICD-10-CM

## 2021-02-18 DIAGNOSIS — H93.A9 PULSATILE TINNITUS: Primary | ICD-10-CM

## 2021-02-26 LAB
QT INTERVAL: 432 MS
QTC INTERVAL: 445 MS

## 2021-03-01 ENCOUNTER — APPOINTMENT (OUTPATIENT)
Dept: MRI IMAGING | Facility: HOSPITAL | Age: 81
End: 2021-03-01

## 2021-03-01 ENCOUNTER — APPOINTMENT (OUTPATIENT)
Dept: CARDIOLOGY | Facility: HOSPITAL | Age: 81
End: 2021-03-01

## (undated) DEVICE — DRN PENRS SAFET/CLIP 1/4X12IN LF

## (undated) DEVICE — PAD GRND REM POLYHESIVE A/ DISP

## (undated) DEVICE — SUT SILK 2/0 TIES 30IN SA85H

## (undated) DEVICE — ROSEBUD DISSECTORS: Brand: DEROYAL

## (undated) DEVICE — SUT NUROLON 0 MO7 CR8 18IN C541D

## (undated) DEVICE — INSUFFLATION NEEDLE TO ESTABLISH PNEUMOPERITONEUM.: Brand: INSUFFLATION NEEDLE

## (undated) DEVICE — HOLDER: Brand: DEROYAL

## (undated) DEVICE — DBD-DRAPE,LAP,CHOLE,W/TROUGHS,STERILE: Brand: MEDLINE

## (undated) DEVICE — SUT PDS 1 TP1 48IN Z880G BX/12

## (undated) DEVICE — SUT MNCRYL 4/0 PS2 18 IN

## (undated) DEVICE — TOTAL TRAY, 16FR 10ML SIL FOLEY, URN: Brand: MEDLINE

## (undated) DEVICE — SUT SILK 2/0 SH 30IN K833H

## (undated) DEVICE — TROCAR: Brand: KII SLEEVE

## (undated) DEVICE — SKIN AFFIX SURG ADHESIVE 72/CS 0.55ML: Brand: MEDLINE

## (undated) DEVICE — GLV SURG SENSICARE W/ALOE PF LF 7.5 STRL

## (undated) DEVICE — PK LAP GEN 70

## (undated) DEVICE — SUT SILK 2/0 TIES 18IN A185H

## (undated) DEVICE — SUT VIC 3/0 SH 27IN J416H

## (undated) DEVICE — APPL CHLORAPREP W/TINT 26ML ORNG

## (undated) DEVICE — PK BASIC 70

## (undated) DEVICE — MARKR SKIN W/RULR AND LBL

## (undated) DEVICE — SUT VIC 0/0 UR6 27IN DYED J603H

## (undated) DEVICE — ENDOCUT SCISSOR TIP, DISPOSABLE: Brand: RENEW

## (undated) DEVICE — TROCAR: Brand: KII FIOS FIRST ENTRY

## (undated) DEVICE — [HIGH FLOW INSUFFLATOR,  DO NOT USE IF PACKAGE IS DAMAGED,  KEEP DRY,  KEEP AWAY FROM SUNLIGHT,  PROTECT FROM HEAT AND RADIOACTIVE SOURCES.]: Brand: PNEUMOSURE

## (undated) DEVICE — PENCL E/S HNDSWCH PUSHBTN HOLSTR 10FT

## (undated) DEVICE — ENCORE® LATEX MICRO SIZE 8, STERILE LATEX POWDER-FREE SURGICAL GLOVE: Brand: ENCORE